# Patient Record
Sex: MALE | Race: OTHER | HISPANIC OR LATINO | URBAN - METROPOLITAN AREA
[De-identification: names, ages, dates, MRNs, and addresses within clinical notes are randomized per-mention and may not be internally consistent; named-entity substitution may affect disease eponyms.]

---

## 2024-03-15 ENCOUNTER — INPATIENT (INPATIENT)
Facility: HOSPITAL | Age: 69
LOS: 0 days | Discharge: HOME | DRG: 313 | End: 2024-03-16
Attending: STUDENT IN AN ORGANIZED HEALTH CARE EDUCATION/TRAINING PROGRAM | Admitting: FAMILY MEDICINE
Payer: COMMERCIAL

## 2024-03-15 VITALS
WEIGHT: 195.11 LBS | HEART RATE: 90 BPM | DIASTOLIC BLOOD PRESSURE: 93 MMHG | SYSTOLIC BLOOD PRESSURE: 135 MMHG | OXYGEN SATURATION: 96 % | TEMPERATURE: 98 F | RESPIRATION RATE: 18 BRPM | HEIGHT: 66 IN

## 2024-03-15 DIAGNOSIS — R07.9 CHEST PAIN, UNSPECIFIED: ICD-10-CM

## 2024-03-15 LAB
ALBUMIN SERPL ELPH-MCNC: 4 G/DL — SIGNIFICANT CHANGE UP (ref 3.3–5)
ALP SERPL-CCNC: 90 U/L — SIGNIFICANT CHANGE UP (ref 40–120)
ALT FLD-CCNC: 70 U/L — SIGNIFICANT CHANGE UP (ref 12–78)
ANION GAP SERPL CALC-SCNC: 6 MMOL/L — SIGNIFICANT CHANGE UP (ref 5–17)
APTT BLD: 33.7 SEC — SIGNIFICANT CHANGE UP (ref 24.5–35.6)
AST SERPL-CCNC: 34 U/L — SIGNIFICANT CHANGE UP (ref 15–37)
BASOPHILS # BLD AUTO: 0.03 K/UL — SIGNIFICANT CHANGE UP (ref 0–0.2)
BASOPHILS NFR BLD AUTO: 0.4 % — SIGNIFICANT CHANGE UP (ref 0–2)
BILIRUB SERPL-MCNC: 0.8 MG/DL — SIGNIFICANT CHANGE UP (ref 0.2–1.2)
BLD GP AB SCN SERPL QL: SIGNIFICANT CHANGE UP
BUN SERPL-MCNC: 16 MG/DL — SIGNIFICANT CHANGE UP (ref 7–23)
CALCIUM SERPL-MCNC: 9.5 MG/DL — SIGNIFICANT CHANGE UP (ref 8.5–10.1)
CHLORIDE SERPL-SCNC: 104 MMOL/L — SIGNIFICANT CHANGE UP (ref 96–108)
CO2 SERPL-SCNC: 27 MMOL/L — SIGNIFICANT CHANGE UP (ref 22–31)
CREAT SERPL-MCNC: 1.14 MG/DL — SIGNIFICANT CHANGE UP (ref 0.5–1.3)
D DIMER BLD IA.RAPID-MCNC: <150 NG/ML DDU — SIGNIFICANT CHANGE UP
EGFR: 71 ML/MIN/1.73M2 — SIGNIFICANT CHANGE UP
EOSINOPHIL # BLD AUTO: 0.34 K/UL — SIGNIFICANT CHANGE UP (ref 0–0.5)
EOSINOPHIL NFR BLD AUTO: 4.7 % — SIGNIFICANT CHANGE UP (ref 0–6)
GLUCOSE SERPL-MCNC: 117 MG/DL — HIGH (ref 70–99)
HCT VFR BLD CALC: 44.3 % — SIGNIFICANT CHANGE UP (ref 39–50)
HGB BLD-MCNC: 15.2 G/DL — SIGNIFICANT CHANGE UP (ref 13–17)
IMM GRANULOCYTES NFR BLD AUTO: 0.3 % — SIGNIFICANT CHANGE UP (ref 0–0.9)
INR BLD: 0.99 RATIO — SIGNIFICANT CHANGE UP (ref 0.85–1.18)
LYMPHOCYTES # BLD AUTO: 2.08 K/UL — SIGNIFICANT CHANGE UP (ref 1–3.3)
LYMPHOCYTES # BLD AUTO: 28.8 % — SIGNIFICANT CHANGE UP (ref 13–44)
MAGNESIUM SERPL-MCNC: 2.1 MG/DL — SIGNIFICANT CHANGE UP (ref 1.6–2.6)
MCHC RBC-ENTMCNC: 28.6 PG — SIGNIFICANT CHANGE UP (ref 27–34)
MCHC RBC-ENTMCNC: 34.3 GM/DL — SIGNIFICANT CHANGE UP (ref 32–36)
MCV RBC AUTO: 83.4 FL — SIGNIFICANT CHANGE UP (ref 80–100)
MONOCYTES # BLD AUTO: 0.5 K/UL — SIGNIFICANT CHANGE UP (ref 0–0.9)
MONOCYTES NFR BLD AUTO: 6.9 % — SIGNIFICANT CHANGE UP (ref 2–14)
NEUTROPHILS # BLD AUTO: 4.24 K/UL — SIGNIFICANT CHANGE UP (ref 1.8–7.4)
NEUTROPHILS NFR BLD AUTO: 58.9 % — SIGNIFICANT CHANGE UP (ref 43–77)
PLATELET # BLD AUTO: 252 K/UL — SIGNIFICANT CHANGE UP (ref 150–400)
POTASSIUM SERPL-MCNC: 3.4 MMOL/L — LOW (ref 3.5–5.3)
POTASSIUM SERPL-SCNC: 3.4 MMOL/L — LOW (ref 3.5–5.3)
PROT SERPL-MCNC: 7.8 GM/DL — SIGNIFICANT CHANGE UP (ref 6–8.3)
PROTHROM AB SERPL-ACNC: 11.2 SEC — SIGNIFICANT CHANGE UP (ref 9.5–13)
RBC # BLD: 5.31 M/UL — SIGNIFICANT CHANGE UP (ref 4.2–5.8)
RBC # FLD: 13.7 % — SIGNIFICANT CHANGE UP (ref 10.3–14.5)
SODIUM SERPL-SCNC: 137 MMOL/L — SIGNIFICANT CHANGE UP (ref 135–145)
TROPONIN I, HIGH SENSITIVITY RESULT: 4.66 NG/L — SIGNIFICANT CHANGE UP
TROPONIN I, HIGH SENSITIVITY RESULT: 5.71 NG/L — SIGNIFICANT CHANGE UP
WBC # BLD: 7.21 K/UL — SIGNIFICANT CHANGE UP (ref 3.8–10.5)
WBC # FLD AUTO: 7.21 K/UL — SIGNIFICANT CHANGE UP (ref 3.8–10.5)

## 2024-03-15 PROCEDURE — 99223 1ST HOSP IP/OBS HIGH 75: CPT

## 2024-03-15 PROCEDURE — 99497 ADVNCD CARE PLAN 30 MIN: CPT | Mod: 25

## 2024-03-15 PROCEDURE — 84100 ASSAY OF PHOSPHORUS: CPT

## 2024-03-15 PROCEDURE — 93306 TTE W/DOPPLER COMPLETE: CPT

## 2024-03-15 PROCEDURE — 85730 THROMBOPLASTIN TIME PARTIAL: CPT

## 2024-03-15 PROCEDURE — 84443 ASSAY THYROID STIM HORMONE: CPT

## 2024-03-15 PROCEDURE — 99285 EMERGENCY DEPT VISIT HI MDM: CPT

## 2024-03-15 PROCEDURE — 85025 COMPLETE CBC W/AUTO DIFF WBC: CPT

## 2024-03-15 PROCEDURE — 71045 X-RAY EXAM CHEST 1 VIEW: CPT | Mod: 26

## 2024-03-15 PROCEDURE — 83735 ASSAY OF MAGNESIUM: CPT

## 2024-03-15 PROCEDURE — 36415 COLL VENOUS BLD VENIPUNCTURE: CPT

## 2024-03-15 PROCEDURE — 93010 ELECTROCARDIOGRAM REPORT: CPT

## 2024-03-15 PROCEDURE — 86803 HEPATITIS C AB TEST: CPT

## 2024-03-15 PROCEDURE — 85610 PROTHROMBIN TIME: CPT

## 2024-03-15 PROCEDURE — 80053 COMPREHEN METABOLIC PANEL: CPT

## 2024-03-15 PROCEDURE — 80061 LIPID PANEL: CPT

## 2024-03-15 PROCEDURE — 83036 HEMOGLOBIN GLYCOSYLATED A1C: CPT

## 2024-03-15 PROCEDURE — 84484 ASSAY OF TROPONIN QUANT: CPT

## 2024-03-15 RX ORDER — ACETAMINOPHEN 500 MG
650 TABLET ORAL EVERY 6 HOURS
Refills: 0 | Status: DISCONTINUED | OUTPATIENT
Start: 2024-03-15 | End: 2024-03-16

## 2024-03-15 RX ORDER — LOSARTAN POTASSIUM 100 MG/1
100 TABLET, FILM COATED ORAL DAILY
Refills: 0 | Status: DISCONTINUED | OUTPATIENT
Start: 2024-03-15 | End: 2024-03-16

## 2024-03-15 RX ORDER — ONDANSETRON 8 MG/1
4 TABLET, FILM COATED ORAL EVERY 6 HOURS
Refills: 0 | Status: DISCONTINUED | OUTPATIENT
Start: 2024-03-15 | End: 2024-03-16

## 2024-03-15 RX ORDER — POTASSIUM CHLORIDE 20 MEQ
20 PACKET (EA) ORAL ONCE
Refills: 0 | Status: COMPLETED | OUTPATIENT
Start: 2024-03-15 | End: 2024-03-15

## 2024-03-15 RX ORDER — SODIUM CHLORIDE 9 MG/ML
3 INJECTION INTRAMUSCULAR; INTRAVENOUS; SUBCUTANEOUS ONCE
Refills: 0 | Status: COMPLETED | OUTPATIENT
Start: 2024-03-15 | End: 2024-03-15

## 2024-03-15 RX ORDER — LOSARTAN/HYDROCHLOROTHIAZIDE 100MG-25MG
1 TABLET ORAL
Refills: 0 | DISCHARGE

## 2024-03-15 RX ORDER — ASPIRIN/CALCIUM CARB/MAGNESIUM 324 MG
81 TABLET ORAL DAILY
Refills: 0 | Status: DISCONTINUED | OUTPATIENT
Start: 2024-03-15 | End: 2024-03-16

## 2024-03-15 RX ORDER — ASPIRIN/CALCIUM CARB/MAGNESIUM 324 MG
1 TABLET ORAL
Refills: 0 | DISCHARGE

## 2024-03-15 RX ADMIN — SODIUM CHLORIDE 3 MILLILITER(S): 9 INJECTION INTRAMUSCULAR; INTRAVENOUS; SUBCUTANEOUS at 18:34

## 2024-03-15 RX ADMIN — Medication 20 MILLIEQUIVALENT(S): at 19:58

## 2024-03-15 NOTE — H&P ADULT - TIME BILLING
A minimum of 75 min was spent completing this admission not including time spent discussing advanced care planning or discussing goals of care with a minimum of 36 min spent face to face with patient while in ED unit on admission, pt agrees to have diagnostic imaging such as TTE in AM with possible cardiac intervention, NPO and bedrest for now, and agrees to cardiology consult. All questions answered.

## 2024-03-15 NOTE — ED ADULT NURSE NOTE - OBJECTIVE STATEMENT
pt presenting for chest pain left side, no radiation, exacerbated by "picking things up." no n/v/d, no respiratory complaints

## 2024-03-15 NOTE — H&P ADULT - NSICDXFAMILYHX_GEN_ALL_CORE_FT
FAMILY HISTORY:  Mother  Still living? Unknown  FH: MI (myocardial infarction), Age at diagnosis: Age Unknown

## 2024-03-15 NOTE — ED STATDOCS - CHIEF COMPLAINT
The patient is a 68y year old Male complaining of chest pain. Agree with resident note and plan.  Karen Vazquez MD

## 2024-03-15 NOTE — ED ADULT TRIAGE NOTE - CHIEF COMPLAINT QUOTE
pt ambulatory to er c/o intermittent chest pain x1 day. pt reports he was doing yard work yesterday when it started. went to MD today, had an ekg which was normal and was advised to come here. denies shortness of breath, dizziness, headache. denies allergies. denies significant past medical history. sent for ekg

## 2024-03-15 NOTE — ED STATDOCS - PATIENT'S SEXUAL ORIENTATION
appropriate. After reviewing your medical record and screening and assessments performed today your provider may have ordered immunizations, labs, imaging, and/or referrals for you. A list of these orders (if applicable) as well as your Preventive Care list are included within your After Visit Summary for your review. Other Preventive Recommendations:    A preventive eye exam performed by an eye specialist is recommended every 1-2 years to screen for glaucoma; cataracts, macular degeneration, and other eye disorders. A preventive dental visit is recommended every 6 months. Try to get at least 150 minutes of exercise per week or 10,000 steps per day on a pedometer . Order or download the FREE \"Exercise & Physical Activity: Your Everyday Guide\" from [employer]. Call 1-948.127.8530 or search [employer] online. You need 5464-3803 mg of calcium and 0720-0328 IU of vitamin D per day. It is possible to meet your calcium requirement with diet alone, but a vitamin D supplement is usually necessary to meet this goal.  When exposed to the sun, use a sunscreen that protects against both UVA and UVB radiation with an SPF of 30 or greater. Reapply every 2 to 3 hours or after sweating, drying off with a towel, or swimming. Always wear a seat belt when traveling in a car. Always wear a helmet when riding a bicycle or motorcycle. Heterosexual

## 2024-03-15 NOTE — ED STATDOCS - OBJECTIVE STATEMENT
69 y/o male with PMHx of HTN who developed non-radiating, left sided chest pain yesterday that lasted hour while raking leaves, No meds for pain, no SOB, no N/V . Pt states today he felt better but still had intermittent pain in left chest, went to urgent care and was advised to go to the ED. Pt without pain at this time.. Pt rpeorts mother  at age 64 from MI

## 2024-03-15 NOTE — H&P ADULT - HISTORY OF PRESENT ILLNESS
Pt is a 67 yo  male with a pmh/o obesity, HTN, who presents to Ed secondary to chest pain which started after gardening for about one hour. Pt states pain has been intermittent but more frequent today. States began yesterday and pain was "stronger yesterday" however he is concerned as his mother passed away from MI in her early 60's. Pt states pain is worse with exertion but is not associated with palpitations, jaw claudication, nausea, vomiting, abdominal pain, diaphoresis, or shortness of breath. States he has not had any prior episodes. Does not have cardiologist.

## 2024-03-15 NOTE — H&P ADULT - ASSESSMENT
Chest pain    Hypokalemia    Hyperglycemia Pt is a 69 yo male with a pmh/o HTN who presents to ED c/o two days of left sided chest pain     #Atypical chest pain  Occurred after gardening and worsened by bending side to side, not reproducible  c/w ASA, s/p full dose  c/w HCTZ/ARB  EKG prn chest pain  trend troponin  cardiology consult  bed rest, NPO after midnight in case of intervention  TTE ordered  zofran prn  DASH/TLC diet if advanced  SCD for DVT ppx    #Hypokalemia  Repleted in ED  Keep K>4, Mg >2  f/u AM levels    #Hyperglycemia  f/u A1c  monitor on serum chemistry  pending result and trend start diabetic management

## 2024-03-15 NOTE — ED STATDOCS - PROGRESS NOTE DETAILS
HEART score elevated to 5. 68-year-old male with past medical history of hypertension presents to the ED complaining of onset of left side chest pain yesterday afternoon while working in his garden pulling weeds and gardening.  pain persisted for a few hours yesterday. no associated nausea, vomiting, dizziness.   no shortness of breath. no prior episodes of chest pain.   was uncomfortable during his sleep last night felt pain with moving pain occurred again today so patient came to the emergency room for further evaluation EKG with normal sinus rhythm no acute ST-T wave changes. potassium 3.4, D-dimer less than 150,troponin 4.66. discussed case with Dr. Aguayo will admit to hospital for further evaluation to rule out ACS.  Mariah Zayas PA-C

## 2024-03-15 NOTE — ED STATDOCS - CLINICAL SUMMARY MEDICAL DECISION MAKING FREE TEXT BOX
69 y/o male with PMHx of HTN, family hx massive MI p/w left sided chest pain while exerting himself in garden, intermittent today. Will obtain labs EKG, CXR

## 2024-03-16 ENCOUNTER — TRANSCRIPTION ENCOUNTER (OUTPATIENT)
Age: 69
End: 2024-03-16

## 2024-03-16 VITALS
TEMPERATURE: 98 F | RESPIRATION RATE: 16 BRPM | OXYGEN SATURATION: 96 % | HEART RATE: 75 BPM | SYSTOLIC BLOOD PRESSURE: 126 MMHG | DIASTOLIC BLOOD PRESSURE: 81 MMHG

## 2024-03-16 DIAGNOSIS — R07.9 CHEST PAIN, UNSPECIFIED: ICD-10-CM

## 2024-03-16 LAB
A1C WITH ESTIMATED AVERAGE GLUCOSE RESULT: 5.7 % — HIGH (ref 4–5.6)
ALBUMIN SERPL ELPH-MCNC: 3.7 G/DL — SIGNIFICANT CHANGE UP (ref 3.3–5)
ALP SERPL-CCNC: 78 U/L — SIGNIFICANT CHANGE UP (ref 40–120)
ALT FLD-CCNC: 60 U/L — SIGNIFICANT CHANGE UP (ref 12–78)
ANION GAP SERPL CALC-SCNC: 7 MMOL/L — SIGNIFICANT CHANGE UP (ref 5–17)
APTT BLD: 34.1 SEC — SIGNIFICANT CHANGE UP (ref 24.5–35.6)
AST SERPL-CCNC: 26 U/L — SIGNIFICANT CHANGE UP (ref 15–37)
BASOPHILS # BLD AUTO: 0.03 K/UL — SIGNIFICANT CHANGE UP (ref 0–0.2)
BASOPHILS NFR BLD AUTO: 0.6 % — SIGNIFICANT CHANGE UP (ref 0–2)
BILIRUB SERPL-MCNC: 0.8 MG/DL — SIGNIFICANT CHANGE UP (ref 0.2–1.2)
BUN SERPL-MCNC: 19 MG/DL — SIGNIFICANT CHANGE UP (ref 7–23)
CALCIUM SERPL-MCNC: 8.9 MG/DL — SIGNIFICANT CHANGE UP (ref 8.5–10.1)
CHLORIDE SERPL-SCNC: 106 MMOL/L — SIGNIFICANT CHANGE UP (ref 96–108)
CHOLEST SERPL-MCNC: 148 MG/DL — SIGNIFICANT CHANGE UP
CO2 SERPL-SCNC: 26 MMOL/L — SIGNIFICANT CHANGE UP (ref 22–31)
CREAT SERPL-MCNC: 0.9 MG/DL — SIGNIFICANT CHANGE UP (ref 0.5–1.3)
EGFR: 94 ML/MIN/1.73M2 — SIGNIFICANT CHANGE UP
EOSINOPHIL # BLD AUTO: 0.34 K/UL — SIGNIFICANT CHANGE UP (ref 0–0.5)
EOSINOPHIL NFR BLD AUTO: 6.3 % — HIGH (ref 0–6)
ESTIMATED AVERAGE GLUCOSE: 117 MG/DL — HIGH (ref 68–114)
GLUCOSE SERPL-MCNC: 108 MG/DL — HIGH (ref 70–99)
HCT VFR BLD CALC: 44.5 % — SIGNIFICANT CHANGE UP (ref 39–50)
HCV AB S/CO SERPL IA: 0.32 S/CO — SIGNIFICANT CHANGE UP (ref 0–0.99)
HCV AB SERPL-IMP: SIGNIFICANT CHANGE UP
HDLC SERPL-MCNC: 49 MG/DL — SIGNIFICANT CHANGE UP
HGB BLD-MCNC: 14.9 G/DL — SIGNIFICANT CHANGE UP (ref 13–17)
IMM GRANULOCYTES NFR BLD AUTO: 0.2 % — SIGNIFICANT CHANGE UP (ref 0–0.9)
INR BLD: 0.99 RATIO — SIGNIFICANT CHANGE UP (ref 0.85–1.18)
LIPID PNL WITH DIRECT LDL SERPL: 90 MG/DL — SIGNIFICANT CHANGE UP
LYMPHOCYTES # BLD AUTO: 1.64 K/UL — SIGNIFICANT CHANGE UP (ref 1–3.3)
LYMPHOCYTES # BLD AUTO: 30.1 % — SIGNIFICANT CHANGE UP (ref 13–44)
MAGNESIUM SERPL-MCNC: 2.1 MG/DL — SIGNIFICANT CHANGE UP (ref 1.6–2.6)
MCHC RBC-ENTMCNC: 28.7 PG — SIGNIFICANT CHANGE UP (ref 27–34)
MCHC RBC-ENTMCNC: 33.5 GM/DL — SIGNIFICANT CHANGE UP (ref 32–36)
MCV RBC AUTO: 85.6 FL — SIGNIFICANT CHANGE UP (ref 80–100)
MONOCYTES # BLD AUTO: 0.37 K/UL — SIGNIFICANT CHANGE UP (ref 0–0.9)
MONOCYTES NFR BLD AUTO: 6.8 % — SIGNIFICANT CHANGE UP (ref 2–14)
NEUTROPHILS # BLD AUTO: 3.05 K/UL — SIGNIFICANT CHANGE UP (ref 1.8–7.4)
NEUTROPHILS NFR BLD AUTO: 56 % — SIGNIFICANT CHANGE UP (ref 43–77)
NON HDL CHOLESTEROL: 100 MG/DL — SIGNIFICANT CHANGE UP
PHOSPHATE SERPL-MCNC: 2.5 MG/DL — SIGNIFICANT CHANGE UP (ref 2.5–4.5)
PLATELET # BLD AUTO: 229 K/UL — SIGNIFICANT CHANGE UP (ref 150–400)
POTASSIUM SERPL-MCNC: 3.5 MMOL/L — SIGNIFICANT CHANGE UP (ref 3.5–5.3)
POTASSIUM SERPL-SCNC: 3.5 MMOL/L — SIGNIFICANT CHANGE UP (ref 3.5–5.3)
PROT SERPL-MCNC: 7.1 GM/DL — SIGNIFICANT CHANGE UP (ref 6–8.3)
PROTHROM AB SERPL-ACNC: 11.2 SEC — SIGNIFICANT CHANGE UP (ref 9.5–13)
RBC # BLD: 5.2 M/UL — SIGNIFICANT CHANGE UP (ref 4.2–5.8)
RBC # FLD: 13.8 % — SIGNIFICANT CHANGE UP (ref 10.3–14.5)
SODIUM SERPL-SCNC: 139 MMOL/L — SIGNIFICANT CHANGE UP (ref 135–145)
TRIGL SERPL-MCNC: 43 MG/DL — SIGNIFICANT CHANGE UP
TSH SERPL-MCNC: 1.84 UU/ML — SIGNIFICANT CHANGE UP (ref 0.34–4.82)
WBC # BLD: 5.44 K/UL — SIGNIFICANT CHANGE UP (ref 3.8–10.5)
WBC # FLD AUTO: 5.44 K/UL — SIGNIFICANT CHANGE UP (ref 3.8–10.5)

## 2024-03-16 PROCEDURE — 99239 HOSP IP/OBS DSCHRG MGMT >30: CPT

## 2024-03-16 PROCEDURE — 93306 TTE W/DOPPLER COMPLETE: CPT | Mod: 26

## 2024-03-16 PROCEDURE — 99223 1ST HOSP IP/OBS HIGH 75: CPT

## 2024-03-16 RX ADMIN — Medication 81 MILLIGRAM(S): at 10:31

## 2024-03-16 RX ADMIN — LOSARTAN POTASSIUM 100 MILLIGRAM(S): 100 TABLET, FILM COATED ORAL at 10:32

## 2024-03-16 NOTE — DISCHARGE NOTE NURSING/CASE MANAGEMENT/SOCIAL WORK - PATIENT PORTAL LINK FT
You can access the FollowMyHealth Patient Portal offered by [facility] by registering at the following website: [email or web address]. By joining [employer]’s FollowMyHealth portal, you will also be able to view your health information using other applications (apps) compatible with our system.

## 2024-03-16 NOTE — DISCHARGE NOTE PROVIDER - NSDCMRMEDTOKEN_GEN_ALL_CORE_FT
aspirin 81 mg oral tablet: 1 tab(s) orally once a day  losartan-hydroCHLOROthiazide 100 mg-25 mg oral tablet: 1 tab(s) orally once a day

## 2024-03-16 NOTE — PROGRESS NOTE ADULT - SUBJECTIVE AND OBJECTIVE BOX
incomplete note    HOSPITALIST PROGRESS NOTE    HPI: Patient is a 67 yo  male with a pmh/o obesity, HTN, who presents to Ed secondary to chest pain which started after gardening for about one hour. Pt states pain has been intermittent but more frequent today. States began yesterday and pain was "stronger yesterday" however he is concerned as his mother passed away from MI in her early 60's. Pt states pain is worse with exertion but is not associated with palpitations, jaw claudication, nausea, vomiting, abdominal pain, diaphoresis, or shortness of breath. States he has not had any prior episodes. Does not have cardiologist.     3/16: SUBJECTIVE / INTERVAL HPI: Patient seen and examined at bedside.     ROS: All 10 systems reviewed and found to be negative with the exception of what has been described above.     VITAL SIGNS:  Vital Signs Last 24 Hrs  T(C): 36.6 (16 Mar 2024 08:19), Max: 36.8 (15 Mar 2024 21:43)  T(F): 97.9 (16 Mar 2024 08:19), Max: 98.3 (15 Mar 2024 21:43)  HR: 65 (16 Mar 2024 08:19) (65 - 90)  BP: 128/77 (16 Mar 2024 08:19) (128/77 - 151/94)  BP(mean): 88 (16 Mar 2024 08:19) (88 - 106)  RR: 18 (16 Mar 2024 08:19) (18 - 18)  SpO2: 95% (16 Mar 2024 08:19) (95% - 98%)    Parameters below as of 16 Mar 2024 08:19  Patient On (Oxygen Delivery Method): room air      PHYSICA EXAM:    General: No acute distress  HEENT: NC/AT; PERRL, anicteric sclera; MMM  Neck: Supple  Cardiovascular: +S1/S2, RRR, no murmurs, rubs, gallops  Respiratory: CTA B/L; no W/R/R  Gastrointestinal: soft, NT/ND; +BSx4  Extremities: WWP; no edema, clubbing or cyanosis  Vascular: 2+ radial, DP/PT pulses B/L  Neurological: AAOx3; no focal deficits    MEDICATIONS:  MEDICATIONS  (STANDING):  aspirin enteric coated 81 milliGRAM(s) Oral daily  hydrochlorothiazide 25 milliGRAM(s) Oral daily  losartan 100 milliGRAM(s) Oral daily    MEDICATIONS  (PRN):  acetaminophen     Tablet .. 650 milliGRAM(s) Oral every 6 hours PRN Temp greater or equal to 38C (100.4F), Mild Pain (1 - 3)  ondansetron Injectable 4 milliGRAM(s) IV Push every 6 hours PRN Nausea      ALLERGIES:  Allergies    Allergy Status Unknown    Intolerances        LABS:                        14.9   5.44  )-----------( 229      ( 16 Mar 2024 07:46 )             44.5     03-16    139  |  106  |  19  ----------------------------<  108<H>  3.5   |  26  |  0.90    Ca    8.9      16 Mar 2024 07:46  Phos  2.5     03-16  Mg     2.1     03-16    TPro  7.1  /  Alb  3.7  /  TBili  0.8  /  DBili  x   /  AST  26  /  ALT  60  /  AlkPhos  78  03-16    PT/INR - ( 16 Mar 2024 07:46 )   PT: 11.2 sec;   INR: 0.99 ratio         PTT - ( 16 Mar 2024 07:46 )  PTT:34.1 sec  Urinalysis Basic - ( 16 Mar 2024 07:46 )    Color: x / Appearance: x / SG: x / pH: x  Gluc: 108 mg/dL / Ketone: x  / Bili: x / Urobili: x   Blood: x / Protein: x / Nitrite: x   Leuk Esterase: x / RBC: x / WBC x   Sq Epi: x / Non Sq Epi: x / Bacteria: x      CAPILLARY BLOOD GLUCOSE            RADIOLOGY & ADDITIONAL TESTS: Reviewed. HOSPITALIST PROGRESS NOTE    HPI: Patient is a 67 yo  male with a pmh/o obesity, HTN, who presents to Ed secondary to chest pain which started after gardening for about one hour. Pt states pain has been intermittent but more frequent today. States began yesterday and pain was "stronger yesterday" however he is concerned as his mother passed away from MI in her early 60's. Pt states pain is worse with exertion but is not associated with palpitations, jaw claudication, nausea, vomiting, abdominal pain, diaphoresis, or shortness of breath. States he has not had any prior episodes. Does not have cardiologist.     3/16: SUBJECTIVE / INTERVAL HPI: Patient seen and examined at bedside. No chest pain. Workup unremarkable     ROS: All 10 systems reviewed and found to be negative with the exception of what has been described above.     VITAL SIGNS:  Vital Signs Last 24 Hrs  T(C): 36.6 (16 Mar 2024 08:19), Max: 36.8 (15 Mar 2024 21:43)  T(F): 97.9 (16 Mar 2024 08:19), Max: 98.3 (15 Mar 2024 21:43)  HR: 65 (16 Mar 2024 08:19) (65 - 90)  BP: 128/77 (16 Mar 2024 08:19) (128/77 - 151/94)  BP(mean): 88 (16 Mar 2024 08:19) (88 - 106)  RR: 18 (16 Mar 2024 08:19) (18 - 18)  SpO2: 95% (16 Mar 2024 08:19) (95% - 98%)    Parameters below as of 16 Mar 2024 08:19  Patient On (Oxygen Delivery Method): room air      PHYSICAL EXAM:  General: No acute distress  HEENT: NC/AT; PERRL, anicteric sclera; MMM  Neck: Supple  Cardiovascular: +S1/S2, RRR, no murmurs, rubs, gallops  Respiratory: CTA B/L; no W/R/R  Gastrointestinal: soft, NT/ND; +BSx4  Extremities: WWP; no edema, clubbing or cyanosis  Vascular: 2+ radial, DP/PT pulses B/L  Neurological: AAOx3; no focal deficits    MEDICATIONS:  MEDICATIONS  (STANDING):  aspirin enteric coated 81 milliGRAM(s) Oral daily  hydrochlorothiazide 25 milliGRAM(s) Oral daily  losartan 100 milliGRAM(s) Oral daily    MEDICATIONS  (PRN):  acetaminophen     Tablet .. 650 milliGRAM(s) Oral every 6 hours PRN Temp greater or equal to 38C (100.4F), Mild Pain (1 - 3)  ondansetron Injectable 4 milliGRAM(s) IV Push every 6 hours PRN Nausea      ALLERGIES:  Allergies    Allergy Status Unknown    Intolerances        LABS:                        14.9   5.44  )-----------( 229      ( 16 Mar 2024 07:46 )             44.5     03-16    139  |  106  |  19  ----------------------------<  108<H>  3.5   |  26  |  0.90    Ca    8.9      16 Mar 2024 07:46  Phos  2.5     03-16  Mg     2.1     03-16    TPro  7.1  /  Alb  3.7  /  TBili  0.8  /  DBili  x   /  AST  26  /  ALT  60  /  AlkPhos  78  03-16    PT/INR - ( 16 Mar 2024 07:46 )   PT: 11.2 sec;   INR: 0.99 ratio         PTT - ( 16 Mar 2024 07:46 )  PTT:34.1 sec  Urinalysis Basic - ( 16 Mar 2024 07:46 )    Color: x / Appearance: x / SG: x / pH: x  Gluc: 108 mg/dL / Ketone: x  / Bili: x / Urobili: x   Blood: x / Protein: x / Nitrite: x   Leuk Esterase: x / RBC: x / WBC x   Sq Epi: x / Non Sq Epi: x / Bacteria: x      CAPILLARY BLOOD GLUCOSE            RADIOLOGY & ADDITIONAL TESTS: Reviewed.

## 2024-03-16 NOTE — DISCHARGE NOTE PROVIDER - HOSPITAL COURSE
Admission HPI: Patient is a 69 yo  male with a pmh/o obesity, HTN, who presents to Ed secondary to chest pain which started after gardening for about one hour. Pt states pain has been intermittent but more frequent today. States began yesterday and pain was "stronger yesterday" however he is concerned as his mother passed away from MI in her early 60's. Pt states pain is worse with exertion but is not associated with palpitations, jaw claudication, nausea, vomiting, abdominal pain, diaphoresis, or shortness of breath. States he has not had any prior episodes. Does not have cardiologist.     3/16: Patient seen and examined at bedside. Currently chest pain free. ROS negative. ECHO with normal EF and without RWMA. Cleared for discharge by Cardiology with plan for outpatient stress in 1 week.    Hospital course (by problem):   #Atypical chest pain  Occurred after gardening and worsened by bending side to side, not reproducible  c/w ASA, s/p full dose  c/w HCTZ/ARB  Trop negative x2  EKG no acute findings   TTE normal EF and without RWMA  Cardiology consult appreciated, cleared for discharge with plan for outpatient follow up for stress test    #Hypokalemia  RESOLVED    Patient was discharged to: Home    Items to follow up as outpatient: Cardiology follow up in 1 week for stress test     Physical exam at the time of discharge:  LOS: 1d    VITALS:   T(C): 36.6 (03-16-24 @ 08:19), Max: 36.8 (03-15-24 @ 21:43)  HR: 67 (03-16-24 @ 10:38) (65 - 90)  BP: 122/84 (03-16-24 @ 10:38) (122/84 - 151/94)  RR: 16 (03-16-24 @ 10:38) (16 - 18)  SpO2: 94% (03-16-24 @ 10:38) (94% - 98%)    PHYSICAL EXAM:  GENERAL: NAD, well-groomed  HEAD:  Atraumatic, Normocephalic  EYES: EOMI, PERRLA, conjunctiva and sclera clear  ENMT: No tonsillar erythema, exudates, or enlargement; MMM  NECK: Supple, No JVD, Normal thyroid  HEART: Regular rate and rhythm; No murmurs, rubs, or gallops  RESPIRATORY: Unlabored respirations. CTA B/L, No W/R/R  ABDOMEN: Soft, Nontender, Nondistended; Bowel sounds present  NEUROLOGY: A&Ox3, nonfocal, moving all extremities  EXTREMITIES:  2+ Peripheral Pulses, No clubbing, cyanosis, or edema  SKIN: warm, dry, normal color, no rash or abnormal lesions

## 2024-03-16 NOTE — DISCHARGE NOTE PROVIDER - NSDCCPCAREPLAN_GEN_ALL_CORE_FT
PRINCIPAL DISCHARGE DIAGNOSIS  Diagnosis: Chest pain  Assessment and Plan of Treatment: Your cardiac workup was normal while in the hospital.  Please follow up outpatient with Dr. Moralez within 1 week of discharge for a stress test.

## 2024-03-16 NOTE — DISCHARGE NOTE PROVIDER - NSDCCPTREATMENT_GEN_ALL_CORE_FT
PRINCIPAL PROCEDURE  Procedure: Transthoracic echocardiography (TTE)  Findings and Treatment: Summary   The aortic valve is well visualized, appears mildly calcified. Valve   opening seems to be normal.   The aortic root is mildly dilated.   Normal appearing left atrium.   The left ventricle is normal in size, wall thickness, wall motion and   contractility.   Mild concentric left ventricular hypertrophy is present.   Estimated left ventricular ejection fraction is 55-60 %.   The IVC appears normal.   The mitral valve leaflets appear calcified.EA reversal of the mitral inflow  consistent with reduced compliance of the left ventricle.   Trace mitral regurgitation is present.   No evidence of pericardial effusion.   Pulmonic valve not well seen.   Trace pulmonic valvular regurgitation is present.   Normal appearing right atrium.   Normal appearing right ventricle structure and function.   Normal appearing tricuspid valve structure.   Trace tricuspid valve regurgitation is present.

## 2024-03-16 NOTE — DISCHARGE NOTE PROVIDER - CARE PROVIDERS DIRECT ADDRESSES
[email or web address]

## 2024-03-16 NOTE — CONSULT NOTE ADULT - SUBJECTIVE AND OBJECTIVE BOX
CHIEF COMPLAINT:    HPI:  Pt is a 69 yo  male with a pmh/o obesity, HTN, who presents to Ed secondary to chest pain which started after gardening for about one hour. Pt states pain has been intermittent but more frequent today. States began yesterday and pain was "stronger yesterday" however he is concerned as his mother passed away from MI in her early 60's. Pt states pain is worse with exertion but is not associated with palpitations, jaw claudication, nausea, vomiting, abdominal pain, diaphoresis, or shortness of breath. States he has not had any prior episodes. Does not have cardiologist.  (15 Mar 2024 21:37)    consulted for chest pain.  REviewd symptoms:  pressure type discomfort intermittent lasting seconds.  Occurred after gardening.  worse w/ sudden movements not walking up stairs  no changes w/ aplpation, breathing, position.  Mom w/ MI in 60s.      PAST MEDICAL AND SURGICAL HISTORY:  PAST MEDICAL & SURGICAL HISTORY:  HTN (hypertension)      H/O: obesity      No significant past surgical history          ALLERGIES:  Allergies    Allergy Status Unknown    Intolerances        SOCIAL HISTORY:  Social History:  ADL independent  lives with family (15 Mar 2024 21:37)      FAMILY  HISTORY:  FAMILY HISTORY:  FH: MI (myocardial infarction) (Mother)        MEDICATIONS:  OUTPATIENT:  Home Medications:  aspirin 81 mg oral tablet: 1 tab(s) orally once a day (15 Mar 2024 21:31)  losartan-hydroCHLOROthiazide 100 mg-25 mg oral tablet: 1 tab(s) orally once a day (15 Mar 2024 21:31)      INPATIENT:  MEDICATIONS  (STANDING):  aspirin enteric coated 81 milliGRAM(s) Oral daily  hydrochlorothiazide 25 milliGRAM(s) Oral daily  losartan 100 milliGRAM(s) Oral daily    MEDICATIONS  (PRN):  acetaminophen     Tablet .. 650 milliGRAM(s) Oral every 6 hours PRN Temp greater or equal to 38C (100.4F), Mild Pain (1 - 3)  ondansetron Injectable 4 milliGRAM(s) IV Push every 6 hours PRN Nausea    MEDICATIONS  (PRN):  acetaminophen     Tablet .. 650 milliGRAM(s) Oral every 6 hours PRN Temp greater or equal to 38C (100.4F), Mild Pain (1 - 3)  ondansetron Injectable 4 milliGRAM(s) IV Push every 6 hours PRN Nausea      REVIEW OF SYSTEMS:  ===============================  ===============================  CONSTITUTIONAL: No weakness, fevers or chills  EYES/ENT: No visual changes;  No vertigo or throat pain   NECK: No pain or stiffness  RESPIRATORY: No cough, wheezing, hemoptysis; No shortness of breath  CARDIOVASCULAR: No chest pain or palpitations  GASTROINTESTINAL: No abdominal or epigastric pain. No nausea, vomiting, or hematemesis;   No diarrhea or constipation. No melena or hematochezia.  GENITOURINARY: No dysuria, frequency or hematuria  NEUROLOGICAL: No numbness or weakness  SKIN: No itching, burning, rashes, or lesions   All other review of systems is negative unless indicated above    Vital Signs Last 24 Hrs  T(C): 36.6 (16 Mar 2024 08:19), Max: 36.8 (15 Mar 2024 21:43)  T(F): 97.9 (16 Mar 2024 08:19), Max: 98.3 (15 Mar 2024 21:43)  HR: 67 (16 Mar 2024 10:38) (65 - 90)  BP: 122/84 (16 Mar 2024 10:38) (122/84 - 151/94)  BP(mean): 88 (16 Mar 2024 08:19) (88 - 106)  RR: 16 (16 Mar 2024 10:38) (16 - 18)  SpO2: 94% (16 Mar 2024 10:38) (94% - 98%)    Parameters below as of 16 Mar 2024 10:38  Patient On (Oxygen Delivery Method): room air        I&O's Summary      I&O's Detail      PHYSICAL EXAM:    Constitutional: NAD, awake and alert, well-developed  HEENT: PERR, EOMI,  No oral cyananosis.  Neck:  supple,  No JVD  Respiratory: Breath sounds are clear bilaterally, No wheezing, rales or rhonchi  Cardiovascular: S1 and S2, regular rate and rhythm, no Murmurs, gallops or rubs  Gastrointestinal: Bowel Sounds present, soft, nontender.   Extremities: No peripheral edema. No clubbing or cyanosis.  Vascular: 2+ peripheral pulses  Neurological: A/O x 3, no focal deficits  Musculoskeletal: no calf tenderness.  Skin: No rashes.    ===============================  ===============================  LABS:                         14.9   5.44  )-----------( 229      ( 16 Mar 2024 07:46 )             44.5                         15.2   7.21  )-----------( 252      ( 15 Mar 2024 18:27 )             44.3     16 Mar 2024 07:46    139    |  106    |  19     ----------------------------<  108    3.5     |  26     |  0.90   15 Mar 2024 18:27    137    |  104    |  16     ----------------------------<  117    3.4     |  27     |  1.14     Ca    8.9        16 Mar 2024 07:46  Ca    9.5        15 Mar 2024 18:27  Phos  2.5       16 Mar 2024 07:46  Mg     2.1       16 Mar 2024 07:46  Mg     2.1       15 Mar 2024 18:27    TPro  7.1    /  Alb  3.7    /  TBili  0.8    /  DBili  x      /  AST  26     /  ALT  60     /  AlkPhos  78     16 Mar 2024 07:46  TPro  7.8    /  Alb  4.0    /  TBili  0.8    /  DBili  x      /  AST  34     /  ALT  70     /  AlkPhos  90     15 Mar 2024 18:27    PT/INR - ( 16 Mar 2024 07:46 )   PT: 11.2 sec;   INR: 0.99 ratio         PTT - ( 16 Mar 2024 07:46 )  PTT:34.1 sec    THYROID STUDIES:    ===============================  ===============================  CARDIAC BIOMARKERS:  -------  -BNP VALUES:    -------  -TROPONIN VALUES:   Troponin I, High Sensitivity Result: 5.71 ng/L (03-15-24 @ 22:55)  Troponin I, High Sensitivity Result: 4.66 ng/L (03-15-24 @ 18:27)      ===============================  ===============================  EKG:  ECG NSR no ischemic changes no prior ECGs   Tele sinus 60s     Echo official read pending - preliminary normal EF no RWMA.

## 2024-03-16 NOTE — PROGRESS NOTE ADULT - ASSESSMENT
Pt is a 67 yo male with a pmh/o HTN who presents to ED c/o two days of left sided chest pain     #Atypical chest pain  Occurred after gardening and worsened by bending side to side, not reproducible  c/w ASA, s/p full dose  c/w HCTZ/ARB  EKG prn chest pain  trend troponin  cardiology consult  bed rest, NPO after midnight in case of intervention  TTE ordered  zofran prn  DASH/TLC diet if advanced  SCD for DVT ppx    #Hypokalemia  Repleted in ED  Keep K>4, Mg >2  f/u AM levels    #Hyperglycemia  f/u A1c  monitor on serum chemistry  pending result and trend start diabetic management   Pt is a 69 yo male with a pmh/o HTN who presents to ED c/o two days of left sided chest pain     #Atypical chest pain  Occurred after gardening and worsened by bending side to side, not reproducible, RESOLVED  c/w ASA, s/p full dose  c/w HCTZ/ARB  Trop negative x2  EKG no acute findings   TTE normal EF and without RWMA  Cardiology consult appreciated, cleared for discharge with plan for outpatient follow up for stress test    #Hypokalemia  RESOLVED    DISPO: Discharge home today with outpatient scheduled stress test in 1 week

## 2024-03-16 NOTE — DISCHARGE NOTE PROVIDER - CARE PROVIDER_API CALL
Adama Moralez)  Cardiology  [address], [address]  [place] [address]  Phone: [phone]  Fax: [phone]  Follow Up Time: 1 week

## 2024-03-16 NOTE — CONSULT NOTE ADULT - PROBLEM SELECTOR RECOMMENDATION 9
-atypical.  trops neg, ECG unremarkable  -prelim review of echo shows normal EF, RWMA.    -if final read on echo confirms normal EF w/ no RWMA,  OK to d/c from cardiac standpoint.  -Followup w/ cardiology UMAIR machado in 1 week to scheduled stress test.

## 2024-03-18 PROBLEM — I10 ESSENTIAL (PRIMARY) HYPERTENSION: Chronic | Status: ACTIVE | Noted: 2024-03-16

## 2024-03-18 PROBLEM — Z86.39 PERSONAL HISTORY OF OTHER ENDOCRINE, NUTRITIONAL AND METABOLIC DISEASE: Chronic | Status: ACTIVE | Noted: 2024-03-16

## 2024-03-19 ENCOUNTER — APPOINTMENT (OUTPATIENT)
Dept: CARDIOLOGY | Facility: CLINIC | Age: 69
End: 2024-03-19
Payer: COMMERCIAL

## 2024-03-19 ENCOUNTER — NON-APPOINTMENT (OUTPATIENT)
Age: 69
End: 2024-03-19

## 2024-03-19 VITALS
DIASTOLIC BLOOD PRESSURE: 72 MMHG | WEIGHT: 197 LBS | SYSTOLIC BLOOD PRESSURE: 148 MMHG | HEART RATE: 100 BPM | RESPIRATION RATE: 15 BRPM | BODY MASS INDEX: 31.66 KG/M2 | TEMPERATURE: 98.6 F | HEIGHT: 66 IN | OXYGEN SATURATION: 98 %

## 2024-03-19 DIAGNOSIS — R07.89 OTHER CHEST PAIN: ICD-10-CM

## 2024-03-19 PROBLEM — Z00.00 ENCOUNTER FOR PREVENTIVE HEALTH EXAMINATION: Status: ACTIVE | Noted: 2024-03-19

## 2024-03-19 PROCEDURE — 93000 ELECTROCARDIOGRAM COMPLETE: CPT

## 2024-03-19 PROCEDURE — 99214 OFFICE O/P EST MOD 30 MIN: CPT | Mod: 25

## 2024-03-19 PROCEDURE — G2211 COMPLEX E/M VISIT ADD ON: CPT | Mod: NC,1L

## 2024-03-19 RX ORDER — LOSARTAN POTASSIUM AND HYDROCHLOROTHIAZIDE 25; 100 MG/1; MG/1
100-25 TABLET ORAL DAILY
Refills: 0 | Status: ACTIVE | COMMUNITY

## 2024-03-19 RX ORDER — ASPIRIN 81 MG
81 TABLET, DELAYED RELEASE (ENTERIC COATED) ORAL DAILY
Refills: 0 | Status: ACTIVE | COMMUNITY

## 2024-03-19 RX ORDER — CHOLECALCIFEROL (VITAMIN D3) 25 MCG
TABLET ORAL DAILY
Refills: 0 | Status: ACTIVE | COMMUNITY

## 2024-03-19 NOTE — HISTORY OF PRESENT ILLNESS
[FreeTextEntry1] : here for followup after hospital admit. admitted for chest pain. ECG, trops neg. Echo normal. Discharged Sat w/ plan for followup today.  no further episodes of chest pain since discharge.  Calculated ASCVD risk 15% over 10 yrs  "moderate intensity stating reccomended for LDL , prescence of risk enhancing fartosr favor intiation or intensification of statin. LDL Reduction should by by at least 30%.

## 2024-03-19 NOTE — ASSESSMENT
[FreeTextEntry1] : A/P:  -Echo -EST  -blood Lpa, homocysteine, hsCRP -CT cardiac noncontrast.  Return after testing to review results.

## 2024-03-21 DIAGNOSIS — E66.9 OBESITY, UNSPECIFIED: ICD-10-CM

## 2024-03-21 DIAGNOSIS — R73.9 HYPERGLYCEMIA, UNSPECIFIED: ICD-10-CM

## 2024-03-21 DIAGNOSIS — R07.89 OTHER CHEST PAIN: ICD-10-CM

## 2024-03-21 DIAGNOSIS — Z79.82 LONG TERM (CURRENT) USE OF ASPIRIN: ICD-10-CM

## 2024-03-21 DIAGNOSIS — I10 ESSENTIAL (PRIMARY) HYPERTENSION: ICD-10-CM

## 2024-03-21 DIAGNOSIS — E87.6 HYPOKALEMIA: ICD-10-CM

## 2024-03-21 DIAGNOSIS — Z82.41 FAMILY HISTORY OF SUDDEN CARDIAC DEATH: ICD-10-CM

## 2024-04-12 ENCOUNTER — APPOINTMENT (OUTPATIENT)
Dept: CT IMAGING | Facility: CLINIC | Age: 69
End: 2024-04-12
Payer: COMMERCIAL

## 2024-04-12 PROCEDURE — 75571 CT HRT W/O DYE W/CA TEST: CPT

## 2024-04-16 ENCOUNTER — APPOINTMENT (OUTPATIENT)
Dept: CARDIOLOGY | Facility: CLINIC | Age: 69
End: 2024-04-16
Payer: COMMERCIAL

## 2024-04-16 ENCOUNTER — NON-APPOINTMENT (OUTPATIENT)
Age: 69
End: 2024-04-16

## 2024-04-16 VITALS — OXYGEN SATURATION: 98 % | HEART RATE: 88 BPM | SYSTOLIC BLOOD PRESSURE: 150 MMHG | DIASTOLIC BLOOD PRESSURE: 82 MMHG

## 2024-04-16 PROCEDURE — 93015 CV STRESS TEST SUPVJ I&R: CPT

## 2024-04-16 PROCEDURE — G2211 COMPLEX E/M VISIT ADD ON: CPT | Mod: NC,1L

## 2024-04-16 PROCEDURE — 93000 ELECTROCARDIOGRAM COMPLETE: CPT

## 2024-04-16 PROCEDURE — 99214 OFFICE O/P EST MOD 30 MIN: CPT | Mod: 25

## 2024-04-16 PROCEDURE — 93306 TTE W/DOPPLER COMPLETE: CPT

## 2024-04-16 NOTE — HISTORY OF PRESENT ILLNESS
[FreeTextEntry1] : here for followup  Reviewed results of testing: EST no ischemia Echo normal EF Ca score 60 on CT scan 60% percentile  homocysteine levels elevated  based on family hx & elevated homocysteine levels & ASCVD risk 15% reccomend statin to reduce risk of future ASCVD pt agrees.  direct LDL 85 indirect 90 apoLipo 129 (>115)

## 2024-06-11 ENCOUNTER — APPOINTMENT (OUTPATIENT)
Dept: CARDIOLOGY | Facility: CLINIC | Age: 69
End: 2024-06-11
Payer: COMMERCIAL

## 2024-06-11 VITALS
DIASTOLIC BLOOD PRESSURE: 74 MMHG | HEIGHT: 66 IN | OXYGEN SATURATION: 97 % | HEART RATE: 72 BPM | BODY MASS INDEX: 31.66 KG/M2 | SYSTOLIC BLOOD PRESSURE: 126 MMHG | WEIGHT: 197 LBS

## 2024-06-11 PROCEDURE — 93000 ELECTROCARDIOGRAM COMPLETE: CPT

## 2024-06-11 PROCEDURE — G2211 COMPLEX E/M VISIT ADD ON: CPT | Mod: NC

## 2024-06-11 PROCEDURE — 99214 OFFICE O/P EST MOD 30 MIN: CPT | Mod: 25

## 2024-06-11 NOTE — HISTORY OF PRESENT ILLNESS
[FreeTextEntry1] : here for followup  reviewed results of lipids: LDL 52 HDL 47   LFTs normal  Discussed family  hx & Ca score reccomend increasing lipitor to 40 pt agrees.

## 2024-06-14 RX ORDER — ATORVASTATIN CALCIUM 40 MG/1
40 TABLET, FILM COATED ORAL
Qty: 1 | Refills: 3 | Status: ACTIVE | COMMUNITY
Start: 2024-04-18 | End: 1900-01-01

## 2024-06-15 NOTE — PATIENT PROFILE ADULT - FUNCTIONAL ASSESSMENT - BASIC MOBILITY ASSESSMENT TYPE
Admission You were seen in the ED for chest pain, neck pain and shoulder pain. Your X-rays and CT were normal.   Your pain is likely related to muscle spasms.     For pain:  1. Take Tylenol 650mg (Two 325 mg pills) every 4-6 hours or two 500mg extra strength Tylenol tablets every 8 hours as needed for pain or fevers.  2. Take Motrin (also sold as Advil or Ibuprofen) 400-600 mg (two or three 200 mg over the counter pills) every 8 hours as needed for moderate pain or fevers-- take with food; do not take for more than 5 consecutive days.  3. Take Flexeril 5 mg every 8 hours as needed for muscle spasm -- causes drowsiness; no drinking alcohol or driving with this medication.  4. Apply lidocaine patch to affected area; this is available over the counter, follow instructions on its packaging.   5. Apply heat packs to the affected areas for additional pain relief.    Please follow-up with your primary care doctor to ensure that you are getting better.     YOUR PAIN MAY GET WORSE BEFORE IT GETS BETTER. If the pain worsens or does not improve within the next 5 days, see your primary care doctor for additional evaluation or return to the ED.  Return to the ED if you develop numbness/tingling in your arms or legs, changes in speech, an inability to move the neck, severe pain that does not get better with medications.

## 2024-08-20 ENCOUNTER — NON-APPOINTMENT (OUTPATIENT)
Age: 69
End: 2024-08-20

## 2024-08-20 ENCOUNTER — APPOINTMENT (OUTPATIENT)
Dept: CARDIOLOGY | Facility: CLINIC | Age: 69
End: 2024-08-20
Payer: COMMERCIAL

## 2024-08-20 VITALS
HEIGHT: 66 IN | WEIGHT: 196 LBS | SYSTOLIC BLOOD PRESSURE: 114 MMHG | OXYGEN SATURATION: 98 % | DIASTOLIC BLOOD PRESSURE: 72 MMHG | BODY MASS INDEX: 31.5 KG/M2 | HEART RATE: 82 BPM

## 2024-08-20 DIAGNOSIS — I10 ESSENTIAL (PRIMARY) HYPERTENSION: ICD-10-CM

## 2024-08-20 DIAGNOSIS — E78.5 HYPERLIPIDEMIA, UNSPECIFIED: ICD-10-CM

## 2024-08-20 PROCEDURE — 93000 ELECTROCARDIOGRAM COMPLETE: CPT

## 2024-08-20 PROCEDURE — G2211 COMPLEX E/M VISIT ADD ON: CPT | Mod: NC

## 2024-08-20 PROCEDURE — 99214 OFFICE O/P EST MOD 30 MIN: CPT | Mod: 25

## 2024-08-20 NOTE — ASSESSMENT
[FreeTextEntry1] : A/P:  HTN: Controlled on current antihypertensive therapy   HLD: Calculated ASCVD risk 15% over 10 yrs, CT heart Calcium score 60, CW current dose of statin, recent labs revealed Total cholesterol 114, HDL 50, LDL 51, LFT's stable  OV 4 months

## 2024-08-20 NOTE — HISTORY OF PRESENT ILLNESS
[FreeTextEntry1] : 67 yo male PMH: HTN, HLD with recent increase in statin on last visit which he claims to be tolerating well, here today in routine cardiac follow up Claims to be doing well no cardiovascular complaints  medications and cardiac testing reviewed (See cardiology summary)

## 2024-08-20 NOTE — PHYSICAL EXAM
[Normal S1, S2] : normal S1, S2 [Soft] : abdomen soft [Non Tender] : non-tender [No Edema] : no edema [Normal] : moves all extremities, no focal deficits, normal speech [Alert and Oriented] : alert and oriented

## 2024-08-20 NOTE — CARDIOLOGY SUMMARY
[de-identified] : 8/20/24 NSR  [de-identified] : 4/16/24 Baseline electrocardiogram: Normal sinus rhythm at a rate of 73 bpm. Stress electrocardiogram: No ischemic ST segment changes. Arrhythmias: Occasional PVC's noted early in recovery. HR&BP response NL Exercise Capacity: The patient achieved 10.1 METS, which is consistent with good exercise capacity. [de-identified] : 4/16/24: NL LV SYS FX EF 55-60%, Mild DD, Trace valvular disease

## 2025-01-07 ENCOUNTER — APPOINTMENT (OUTPATIENT)
Dept: CARDIOLOGY | Facility: CLINIC | Age: 70
End: 2025-01-07

## 2025-01-07 VITALS
OXYGEN SATURATION: 100 % | HEART RATE: 75 BPM | HEIGHT: 66 IN | WEIGHT: 197 LBS | DIASTOLIC BLOOD PRESSURE: 78 MMHG | SYSTOLIC BLOOD PRESSURE: 124 MMHG | BODY MASS INDEX: 31.66 KG/M2

## 2025-01-07 PROCEDURE — 99214 OFFICE O/P EST MOD 30 MIN: CPT

## 2025-01-07 PROCEDURE — 93000 ELECTROCARDIOGRAM COMPLETE: CPT

## 2025-01-07 PROCEDURE — G2211 COMPLEX E/M VISIT ADD ON: CPT | Mod: NC

## 2025-08-19 ENCOUNTER — APPOINTMENT (OUTPATIENT)
Dept: CARDIOLOGY | Facility: CLINIC | Age: 70
End: 2025-08-19
Payer: COMMERCIAL

## 2025-08-19 VITALS
BODY MASS INDEX: 31.66 KG/M2 | HEIGHT: 66 IN | SYSTOLIC BLOOD PRESSURE: 150 MMHG | OXYGEN SATURATION: 98 % | DIASTOLIC BLOOD PRESSURE: 80 MMHG | HEART RATE: 68 BPM | WEIGHT: 197 LBS

## 2025-08-19 PROCEDURE — G2211 COMPLEX E/M VISIT ADD ON: CPT | Mod: NC

## 2025-08-19 PROCEDURE — 93000 ELECTROCARDIOGRAM COMPLETE: CPT

## 2025-08-19 PROCEDURE — 99214 OFFICE O/P EST MOD 30 MIN: CPT

## 2025-08-19 RX ORDER — MV-MIN/FOLIC/K1/LYCOPEN/LUTEIN 300-60 MCG
TABLET ORAL DAILY
Refills: 0 | Status: ACTIVE | COMMUNITY